# Patient Record
Sex: FEMALE | Race: WHITE | NOT HISPANIC OR LATINO | Employment: OTHER | URBAN - METROPOLITAN AREA
[De-identification: names, ages, dates, MRNs, and addresses within clinical notes are randomized per-mention and may not be internally consistent; named-entity substitution may affect disease eponyms.]

---

## 2023-10-26 ENCOUNTER — OFFICE VISIT (OUTPATIENT)
Dept: CARDIOLOGY | Facility: CLINIC | Age: 63
End: 2023-10-26
Payer: MEDICARE

## 2023-10-26 VITALS
HEART RATE: 61 BPM | DIASTOLIC BLOOD PRESSURE: 78 MMHG | HEIGHT: 59 IN | SYSTOLIC BLOOD PRESSURE: 128 MMHG | BODY MASS INDEX: 32.05 KG/M2 | WEIGHT: 159 LBS | OXYGEN SATURATION: 93 %

## 2023-10-26 DIAGNOSIS — I10 PRIMARY HYPERTENSION: ICD-10-CM

## 2023-10-26 DIAGNOSIS — G47.33 OBSTRUCTIVE SLEEP APNEA: ICD-10-CM

## 2023-10-26 RX ORDER — MELATONIN
1000 DAILY
COMMUNITY

## 2023-10-26 RX ORDER — OMEPRAZOLE 40 MG/1
CAPSULE, DELAYED RELEASE ORAL
COMMUNITY
Start: 2023-09-28

## 2023-10-26 RX ORDER — CALCIUM CARBONATE/VITAMIN D3 500 MG-10
TABLET ORAL DAILY
COMMUNITY

## 2023-10-26 RX ORDER — CARBAMAZEPINE 200 MG/1
TABLET ORAL
COMMUNITY
Start: 2023-10-24

## 2023-10-26 RX ORDER — BENZTROPINE MESYLATE 0.5 MG/1
TABLET ORAL
COMMUNITY
Start: 2023-09-26

## 2023-10-26 RX ORDER — ALENDRONATE SODIUM 70 MG/1
TABLET ORAL
COMMUNITY
Start: 2023-10-24

## 2023-10-26 RX ORDER — SODIUM CHLORIDE 1 G/1
1 TABLET ORAL 3 TIMES DAILY
COMMUNITY

## 2023-10-26 RX ORDER — RISPERIDONE 3 MG/1
TABLET ORAL
COMMUNITY
Start: 2023-09-26

## 2023-10-26 RX ORDER — MULTIPLE VITAMINS W/ MINERALS TAB 9MG-400MCG
1 TAB ORAL DAILY
COMMUNITY

## 2023-10-26 RX ORDER — CITALOPRAM 40 MG/1
TABLET ORAL
COMMUNITY
Start: 2023-09-26

## 2023-10-26 RX ORDER — ATORVASTATIN CALCIUM 40 MG/1
TABLET, FILM COATED ORAL
COMMUNITY
Start: 2023-10-24

## 2023-10-26 RX ORDER — NYSTATIN 100000 [USP'U]/G
POWDER TOPICAL
COMMUNITY
Start: 2023-10-11

## 2023-10-26 RX ORDER — DONEPEZIL HYDROCHLORIDE 10 MG/1
TABLET, FILM COATED ORAL
COMMUNITY
Start: 2023-09-26

## 2023-10-26 RX ORDER — NEBIVOLOL 5 MG/1
TABLET ORAL
COMMUNITY
Start: 2023-10-24

## 2023-10-26 RX ORDER — ALBUTEROL SULFATE 0.63 MG/3ML
SOLUTION RESPIRATORY (INHALATION)
COMMUNITY
Start: 2023-10-11

## 2023-10-26 NOTE — PROGRESS NOTES
[facility] Provider Note     Date:   10/26/2023   Name: Shaila Davison  :   1960  PCP: Ivette Leonard APRN    Chief Complaint   Patient presents with    Follow-up    Sleep Apnea     1 YR       Subjective     History of Present Illness  Shaila Davison is a 63 y.o. female who presents today for annual follow-up on known NAS.  Her  is with her today and reports that she has had excessive daytime sleepiness and fatigue.  She and the patient psychiatrist think that its early stages of dementia as patient has had worsening cognitive decline recently.  Patient has cognitive delay at baseline.  She is asleep in the chair today.    PAP compliance report dated 2023-10 24 2023 showing patient uses her machine 100% of the time, over 4 hours 97% of the time, for an average use of 9 hours and 17 minutes.  Her overall AHI is 6.0 showing excellent compliance and excellent control.  Her sleep apnea is very well controlled and patient is getting adequate sleep.  I do not feel her excessive daytime sleepiness would be related to this.  Medication changes or dementia are very reasonable differentials.    1.  NAS-AHI 67  2.  Hypertension  3.  Mitral insufficiency  4.  Rheumatoid arthritis  5.  Non-smoker    2018 PSG      Not on File    Current Outpatient Medications:     albuterol (ACCUNEB) 0.63 MG/3ML nebulizer solution, , Disp: , Rfl:     alendronate (FOSAMAX) 70 MG tablet, , Disp: , Rfl:     atorvastatin (LIPITOR) 40 MG tablet, , Disp: , Rfl:     benztropine (COGENTIN) 0.5 MG tablet, , Disp: , Rfl:     Calcium Carb-Cholecalciferol (Oyster Shell Calcium/D) 500-10 MG-MCG tablet tablet, Take  by mouth Daily., Disp: , Rfl:     carBAMazepine (TEGretol) 200 MG tablet, , Disp: , Rfl:     Cholecalciferol 25 MCG (1000 UT) tablet, Take 1 tablet by mouth Daily. TAKES 2000 UNITS, Disp: , Rfl:     citalopram (CeleXA) 40 MG tablet, , Disp: , Rfl:     donepezil (ARICEPT) 10 MG tablet, ,  "Disp: , Rfl:     folic acid-pyridoxine-cyanocobalamin (FOLBIC) 2.5-25-2 MG tablet tablet, Take  by mouth Daily., Disp: , Rfl:     multivitamin with minerals ([employer] Adult) tablet tablet, Take 1 tablet by mouth Daily., Disp: , Rfl:     nebivolol (BYSTOLIC) 5 MG tablet, , Disp: , Rfl:     nystatin 527100 UNIT/GM topical powder, , Disp: , Rfl:     omeprazole (priLOSEC) 40 MG capsule, , Disp: , Rfl:     risperiDONE (risperDAL) 3 MG tablet, , Disp: , Rfl:     sodium chloride 1 g tablet, Take 1 tablet by mouth 3 (Three) Times a Day., Disp: , Rfl:     Specialty Vitamins Products (CENTRY ADVANTAGE PO), Take  by mouth., Disp: , Rfl:     Past Medical History:   Diagnosis Date    Epilepsy     Hypersomnia, transient     Mitral valve insufficiency     Obstructive sleep apnea 10/26/2023    Primary hypertension 10/26/2023    RA (rheumatoid arthritis)       Past Surgical History:   Procedure Laterality Date    NOSE SURGERY       History reviewed. No pertinent family history.  Social History     Socioeconomic History    Marital status: Single   Tobacco Use    Smoking status: Never     Passive exposure: Never    Smokeless tobacco: Never   Vaping Use    Vaping Use: Never used   Substance and Sexual Activity    Alcohol use: Never    Drug use: Never    Sexual activity: Defer       Objective     Vital Signs:  /78 (BP Location: Left arm)   Pulse 61   Ht 149.9 cm (59\")   Wt 72.1 kg (159 lb)   SpO2 93%   BMI 32.11 kg/m²   Estimated body mass index is 32.11 kg/m² as calculated from the following:    Height as of this encounter: 149.9 cm (59\").    Weight as of this encounter: 72.1 kg (159 lb).             Physical Exam  Vitals reviewed.   Cardiovascular:      Rate and Rhythm: Normal rate and regular rhythm.      Heart sounds: Normal heart sounds.   Pulmonary:      Effort: Pulmonary effort is normal.   Skin:     General: Skin is warm and dry.   Neurological:      Mental Status: She is alert. Mental status is at baseline.      Motor: " No weakness.      Gait: Gait normal.                     Assessment and Plan     Diagnoses and all orders for this visit:    1. Obstructive sleep apnea  Comments:  Benefiting from PAP therapy.  Plan to continue.  Orders:  -     PAP Therapy    2. Primary hypertension  Comments:  Treating NAS will likely benefit blood pressure.        Recommendations: ER if symptoms increase, Report if any new/changing symptoms immediately, Sleep risks reviewed (driving, medical, sleep death, sedating agents), and Sleep hygiene discussed              Follow Up  Return in about 1 year (around 10/26/2024) for NAS.    Julissa Staley, APRN   10/26/2023     Please note that this explicitly excludes time spent on other separate billable services such as performing procedures or test interpretation, when applicable.    This note was created using dictation software which occasionally transcribes nonsensical phrases. Please contact the provider if any clarification is needed.